# Patient Record
Sex: MALE | Race: WHITE | Employment: FULL TIME | URBAN - NONMETROPOLITAN AREA
[De-identification: names, ages, dates, MRNs, and addresses within clinical notes are randomized per-mention and may not be internally consistent; named-entity substitution may affect disease eponyms.]

---

## 2017-01-20 ENCOUNTER — AMBULATORY - GICH (OUTPATIENT)
Dept: RADIOLOGY | Facility: OTHER | Age: 57
End: 2017-01-20

## 2017-01-20 DIAGNOSIS — M54.2 CERVICALGIA: ICD-10-CM

## 2017-01-23 ENCOUNTER — HOSPITAL ENCOUNTER (OUTPATIENT)
Dept: RADIOLOGY | Facility: OTHER | Age: 57
End: 2017-01-23

## 2017-01-23 DIAGNOSIS — M54.2 CERVICALGIA: ICD-10-CM

## 2017-02-23 ENCOUNTER — AMBULATORY - GICH (OUTPATIENT)
Dept: RADIOLOGY | Facility: OTHER | Age: 57
End: 2017-02-23

## 2017-02-23 DIAGNOSIS — M54.12 RADICULOPATHY OF CERVICAL REGION: ICD-10-CM

## 2017-05-02 ENCOUNTER — AMBULATORY - GICH (OUTPATIENT)
Dept: RADIOLOGY | Facility: OTHER | Age: 57
End: 2017-05-02

## 2017-05-02 ENCOUNTER — HOSPITAL ENCOUNTER (OUTPATIENT)
Dept: RADIOLOGY | Facility: OTHER | Age: 57
End: 2017-05-02

## 2017-05-02 DIAGNOSIS — M54.12 RADICULOPATHY OF CERVICAL REGION: ICD-10-CM

## 2018-01-03 NOTE — PROGRESS NOTES
Patient Information     Patient Name MRN Sex Gilberto Pena [id number] Male 1960      Progress Notes by Mackenzie Alexandra at 2017 10:03 AM     Author:  Mackenzie Alexandra Service:  (none) Author Type:  Other Clinical Staff     Filed:  2017 10:03 AM Date of Service:  2017 10:03 AM Status:  Signed     :  Mackenzie Alexandra (Other Clinical Staff)            Falls Risk Criteria:    Age 65 and older or under age 4        Sensory deficits    Poor vision    Use of ambulatory aides    Impaired judgment    Unable to walk independently    Meets High Risk criteria for falls:  no

## 2018-01-04 NOTE — PROGRESS NOTES
Patient Information     Patient Name MRN Sex Gilberto Pena [id number] Male 1960      Progress Notes by Argentina Hdz at 2017  7:57 AM     Author:  Argentina Hdz Service:  (none) Author Type:  (none)     Filed:  2017  7:57 AM Date of Service:  2017  7:57 AM Status:  Signed     :  Argentina Hdz            RECOVERY TIME  Some numbness may be present 2-4 hours after the injection, which could impair your normal driving, reflexes.  You will need someone to drive you home from your exam due to the effects of certain medications.    You may experience numbness and/or relief of your pain for up to 4-6 hours after the injection.  Your usual symptoms may return the night of the procedure and may possible be more severe than usual a day or two following.  Please keep track of your pain over the next several days and report how long the relief lasts to the doctor who referred you for this procedure.    The beneficial effects of the steroids usually require 2 to 3 days to take effect, buy may take as long as 5 to 7 days.  If there is no change in the pain, then investigation can be focused on other possible sources of your pain.  In either case, the information is useful to the doctor who referred you for this procedure.    POSSIBLE SIDE EFFECTS  Facial flushing (redness), occasional low grade fevers of 99.5F or less, hiccups, insomnia, headaches, increased heart rate, abdominal cramping, and/or a bloating feeling are side effects of the steroid medications and will go away 3 to 4 days after the injection.    Diabetic Patients  The steroids you have received may significantly increase your blood sugar levels.  Monitor your blood sugar level closely (4-6 times per day) for a period of 4 days or until your blood sugar level normalizes.  If your blood sugar level elevates significantly or you experience confusion, dizziness, sweating, please notify our primary physician and make him/her aware that  you have received steroids.

## 2018-01-04 NOTE — PROGRESS NOTES
Patient Information     Patient Name MRN Sex Gilberto Pena [id number] Male 1960      Progress Notes by Argentina Hdz at 2017  7:57 AM     Author:  Argentina Hdz Service:  (none) Author Type:  (none)     Filed:  2017  7:57 AM Date of Service:  2017  7:57 AM Status:  Signed     :  Argentina Hdz            [facility] Protocol    A. Pre-procedure verification complete yes  1-relevant information / documentation available, reviewed and properly matched to the patient; 2-consent accurate and complete, 3-equipment and supplies available    B. Site marking complete Yes  Site marked if not in continuous attendance with patient    C. TIME OUT completed yes  Time Out was conducted just prior to starting procedure to verify the eight required elements: 1-patient identity, 2-consent accurate and complete, 3-position, 4-correct side/site marked (if applicable), 5-procedure, 6-relevant images / results properly labeled and displayed (if applicable), 7-antibiotics / irrigation fluids (if applicable), 8-safety precautions.

## 2018-01-04 NOTE — PROGRESS NOTES
Patient Information     Patient Name MRN Sex Gilberto Pena [id number] Male 1960      Progress Notes by Argentina Hdz at 2017  7:57 AM     Author:  Argentina Hdz Service:  (none) Author Type:  (none)     Filed:  2017  7:57 AM Date of Service:  2017  7:57 AM Status:  Signed     :  Argentina Hdz            Falls Risk Criteria:    Age 65 and older or under age 4        Sensory deficits    Poor vision    Use of ambulatory aides    Impaired judgment    Unable to walk independently    Meets High Risk criteria for falls:  no

## 2018-01-17 ENCOUNTER — AMBULATORY - GICH (OUTPATIENT)
Dept: RADIOLOGY | Facility: OTHER | Age: 58
End: 2018-01-17

## 2018-01-17 DIAGNOSIS — M54.2 CERVICALGIA: ICD-10-CM

## 2018-01-19 ENCOUNTER — HOSPITAL ENCOUNTER (OUTPATIENT)
Dept: RADIOLOGY | Facility: OTHER | Age: 58
End: 2018-01-19

## 2018-01-19 DIAGNOSIS — M54.2 CERVICALGIA: ICD-10-CM

## 2018-02-13 NOTE — PROGRESS NOTES
Patient Information     Patient Name MRN Sex Gilberto Pena [id number] Male 1960      Progress Notes by Elizabeth Templeton at 2018  8:50 AM     Author:  Elizabeth Templeton Service:  (none) Author Type:  Other Clinical Staff     Filed:  2018  8:50 AM Date of Service:  2018  8:50 AM Status:  Signed     :  Elizabeth Templeton (Other Clinical Staff)            Falls Risk Criteria:    Age 65 and older or under age 4        Sensory deficits    Poor vision    Use of ambulatory aides    Impaired judgment    Unable to walk independently    Meets High Risk criteria for falls:  no

## 2018-02-14 ENCOUNTER — DOCUMENTATION ONLY (OUTPATIENT)
Dept: FAMILY MEDICINE | Facility: OTHER | Age: 58
End: 2018-02-14

## 2018-02-14 PROBLEM — M10.9 GOUT: Status: ACTIVE | Noted: 2018-02-14

## 2018-02-14 PROBLEM — H92.01 RIGHT EAR PAIN: Status: ACTIVE | Noted: 2017-01-13

## 2018-02-14 PROBLEM — E78.5 HYPERLIPIDEMIA: Status: ACTIVE | Noted: 2018-02-14

## 2018-02-14 PROBLEM — I10 HTN (HYPERTENSION): Status: ACTIVE | Noted: 2018-02-14

## 2018-02-14 RX ORDER — INDOMETHACIN 50 MG/1
50 CAPSULE ORAL
COMMUNITY
Start: 2017-01-13

## 2018-02-14 RX ORDER — CHLORAL HYDRATE 500 MG
CAPSULE ORAL
COMMUNITY

## 2018-02-14 RX ORDER — ROSUVASTATIN CALCIUM 5 MG/1
5 TABLET, COATED ORAL AT BEDTIME
COMMUNITY
Start: 2017-08-01

## 2018-02-14 RX ORDER — LOSARTAN POTASSIUM 50 MG/1
50 TABLET ORAL DAILY
COMMUNITY

## 2018-02-14 RX ORDER — LOSARTAN POTASSIUM AND HYDROCHLOROTHIAZIDE 12.5; 5 MG/1; MG/1
1 TABLET ORAL DAILY
COMMUNITY
Start: 2018-01-12

## 2018-02-14 RX ORDER — FENOFIBRATE 145 MG/1
145 TABLET, COATED ORAL
COMMUNITY
Start: 2017-01-13

## 2018-02-14 RX ORDER — ATENOLOL 50 MG/1
50 TABLET ORAL DAILY
COMMUNITY
Start: 2018-01-12

## 2018-02-14 RX ORDER — OMEPRAZOLE 40 MG/1
40 CAPSULE, DELAYED RELEASE ORAL DAILY
COMMUNITY
Start: 2017-01-13

## 2018-02-14 RX ORDER — VITAMIN B COMPLEX
1 TABLET ORAL DAILY
COMMUNITY

## 2018-07-24 NOTE — PROGRESS NOTES
Patient Information     Patient Name  Gilberto Deal MRN  [id number] Sex  Male   1960      Letter by Carrie Harrison PA at      Author:  Carrie Harrison PA Service:  (none) Author Type:  (none)    Filed:   Date of Service:   Status:  (Other)           Gilberto Deal  [address] [facility] [address]          2017    Dear Mr. Deal:      As discussed on the phone, results showing severe left and moderate to severe right neural foraminal stenosis.  We will see if a steroid injection is beneficial.  If it is not, you may need to see your spine surgeon in [place].     Please call if you have any questions or concerns.     Sincerely,     Carrie Harrison PA-C.......................2017 4:04 PM        Resulted Orders    MR SPINE CERVICAL WO (Exam End: 2017 10:30 AM)     Narrative    MR SPINE CERVICAL WO    HISTORY: Neck and bilateral shoulder pain.    TECHNIQUE: Sagittal T1, T2 and STIR as well as axial T2 gradient and 3-D T2 images of the cervical spine were obtained.    COMPARISON: None.    FINDINGS:      The cervical lordosis is preserved.  No abnormalities of alignment are identified.  The vertebral body heights are maintained.  The marrow signal is notable for focal Modic type III change at C6-7.    The cervical cord has a normal caliber.  There are no areas of abnormal cord signal.  No masses or fluid collections are seen in the spinal canal or paravertebral soft tissues.  The craniocervical junction is unremarkable. Intervertebral disc demonstrate diffuse partial desiccation. There is mild disc height loss at C6-7 and T2-3.    C2-3: Minimal degenerative facet hypertrophy. No spinal stenosis. No neural foraminal stenosis.    C3-4: Minimal uncovertebral and facet joint degenerative changes.  No spinal stenosis. No neural foraminal stenosis.    C4-5: Minimal posterior disc osteophyte complex. Mild uncovertebral and facet joint degenerative changes.  No spinal stenosis. Mild bilateral  neural foraminal stenosis.    C5-6: Mild posterior disc osteophyte complex. Mild uncovertebral and facet joint degenerative changes. Minimal spinal stenosis. Mild to moderate bilateral neural foraminal stenosis.    C6-7: Moderate posterior disc osteophyte complex. Moderate uncovertebral and facet joint degenerative changes. Mild-to-moderate spinal stenosis. Severe left and moderate to severe right neural foraminal stenosis.    C7-T1: Mild facet joint degenerative changes.  No spinal stenosis. No neural foraminal stenosis.    T2-3: Mild broad-based disc bulge with a superimposed mild central protrusion, abutting the ventral cord and effacing the ventral CSF. Spinal stenosis is overall mild. Foraminal stenoses are mild.    IMPRESSION:    Prominently mild degenerative changes of the cervical spine, most pronounced at C6-7 and T2-3. The nerve that is most likely impinged is the left C6 nerve; correlate for a related radiculopathy.    Electronically Signed By: Ryan Harp on 1/23/2017 1:31 PM

## 2019-08-15 ENCOUNTER — HOSPITAL ENCOUNTER (OUTPATIENT)
Dept: MRI IMAGING | Facility: OTHER | Age: 59
End: 2019-08-15
Attending: NURSE PRACTITIONER
Payer: COMMERCIAL

## 2019-08-15 ENCOUNTER — HOSPITAL ENCOUNTER (OUTPATIENT)
Dept: MRI IMAGING | Facility: OTHER | Age: 59
Discharge: HOME | End: 2019-08-15
Attending: NURSE PRACTITIONER | Admitting: FAMILY MEDICINE
Payer: COMMERCIAL

## 2019-08-15 DIAGNOSIS — M54.16 LUMBAR BACK PAIN WITH RADICULOPATHY AFFECTING LEFT LOWER EXTREMITY: ICD-10-CM

## 2019-08-15 DIAGNOSIS — R52 PAIN: ICD-10-CM

## 2019-08-15 PROCEDURE — 72148 MRI LUMBAR SPINE W/O DYE: CPT

## 2019-08-15 PROCEDURE — 73721 MRI JNT OF LWR EXTRE W/O DYE: CPT | Mod: LT

## 2020-03-02 ENCOUNTER — HEALTH MAINTENANCE LETTER (OUTPATIENT)
Age: 60
End: 2020-03-02

## 2020-12-20 ENCOUNTER — HEALTH MAINTENANCE LETTER (OUTPATIENT)
Age: 60
End: 2020-12-20

## 2021-04-18 ENCOUNTER — HEALTH MAINTENANCE LETTER (OUTPATIENT)
Age: 61
End: 2021-04-18

## 2021-10-03 ENCOUNTER — HEALTH MAINTENANCE LETTER (OUTPATIENT)
Age: 61
End: 2021-10-03

## 2022-05-14 ENCOUNTER — HEALTH MAINTENANCE LETTER (OUTPATIENT)
Age: 62
End: 2022-05-14

## 2022-09-04 ENCOUNTER — HEALTH MAINTENANCE LETTER (OUTPATIENT)
Age: 62
End: 2022-09-04

## 2023-06-03 ENCOUNTER — HEALTH MAINTENANCE LETTER (OUTPATIENT)
Age: 63
End: 2023-06-03